# Patient Record
Sex: FEMALE | Race: WHITE | URBAN - METROPOLITAN AREA
[De-identification: names, ages, dates, MRNs, and addresses within clinical notes are randomized per-mention and may not be internally consistent; named-entity substitution may affect disease eponyms.]

---

## 2017-11-02 ENCOUNTER — APPOINTMENT (RX ONLY)
Dept: URBAN - METROPOLITAN AREA CLINIC 39 | Facility: CLINIC | Age: 32
Setting detail: DERMATOLOGY
End: 2017-11-02

## 2017-11-02 DIAGNOSIS — L57.8 OTHER SKIN CHANGES DUE TO CHRONIC EXPOSURE TO NONIONIZING RADIATION: ICD-10-CM

## 2017-11-02 DIAGNOSIS — Z87.2 PERSONAL HISTORY OF DISEASES OF THE SKIN AND SUBCUTANEOUS TISSUE: ICD-10-CM

## 2017-11-02 DIAGNOSIS — L82.1 OTHER SEBORRHEIC KERATOSIS: ICD-10-CM

## 2017-11-02 DIAGNOSIS — L81.4 OTHER MELANIN HYPERPIGMENTATION: ICD-10-CM

## 2017-11-02 DIAGNOSIS — B36.0 PITYRIASIS VERSICOLOR: ICD-10-CM

## 2017-11-02 DIAGNOSIS — L663 OTHER SPECIFIED DISEASES OF HAIR AND HAIR FOLLICLES: ICD-10-CM

## 2017-11-02 DIAGNOSIS — B35.3 TINEA PEDIS: ICD-10-CM

## 2017-11-02 DIAGNOSIS — L738 OTHER SPECIFIED DISEASES OF HAIR AND HAIR FOLLICLES: ICD-10-CM

## 2017-11-02 DIAGNOSIS — L73.9 FOLLICULAR DISORDER, UNSPECIFIED: ICD-10-CM

## 2017-11-02 DIAGNOSIS — L91.0 HYPERTROPHIC SCAR: ICD-10-CM

## 2017-11-02 DIAGNOSIS — D22 MELANOCYTIC NEVI: ICD-10-CM

## 2017-11-02 PROBLEM — L02.222 FURUNCLE OF BACK [ANY PART, EXCEPT BUTTOCK]: Status: ACTIVE | Noted: 2017-11-02

## 2017-11-02 PROBLEM — D22.72 MELANOCYTIC NEVI OF LEFT LOWER LIMB, INCLUDING HIP: Status: ACTIVE | Noted: 2017-11-02

## 2017-11-02 PROBLEM — D22.71 MELANOCYTIC NEVI OF RIGHT LOWER LIMB, INCLUDING HIP: Status: ACTIVE | Noted: 2017-11-02

## 2017-11-02 PROBLEM — D48.5 NEOPLASM OF UNCERTAIN BEHAVIOR OF SKIN: Status: ACTIVE | Noted: 2017-11-02

## 2017-11-02 PROBLEM — D22.61 MELANOCYTIC NEVI OF RIGHT UPPER LIMB, INCLUDING SHOULDER: Status: ACTIVE | Noted: 2017-11-02

## 2017-11-02 PROBLEM — D22.5 MELANOCYTIC NEVI OF TRUNK: Status: ACTIVE | Noted: 2017-11-02

## 2017-11-02 PROBLEM — D22.62 MELANOCYTIC NEVI OF LEFT UPPER LIMB, INCLUDING SHOULDER: Status: ACTIVE | Noted: 2017-11-02

## 2017-11-02 PROCEDURE — 99214 OFFICE O/P EST MOD 30 MIN: CPT

## 2017-11-02 PROCEDURE — ? PRESCRIPTION

## 2017-11-02 PROCEDURE — ? COUNSELING

## 2017-11-02 PROCEDURE — ? TREATMENT REGIMEN

## 2017-11-02 RX ORDER — CLINDAMYCIN PHOSPHATE 10 MG/ML
SOLUTION TOPICAL
Qty: 1 | Refills: 3 | Status: ERX | COMMUNITY
Start: 2017-11-02

## 2017-11-02 RX ORDER — ECONAZOLE NITRATE 10 MG/G
AEROSOL, FOAM TOPICAL
Qty: 1 | Refills: 5 | Status: ERX | COMMUNITY
Start: 2017-11-02

## 2017-11-02 RX ADMIN — CLINDAMYCIN PHOSPHATE 1: 10 SOLUTION TOPICAL at 00:00

## 2017-11-02 RX ADMIN — ECONAZOLE NITRATE: 10 AEROSOL, FOAM TOPICAL at 00:00

## 2017-11-02 ASSESSMENT — LOCATION DETAILED DESCRIPTION DERM
LOCATION DETAILED: RIGHT ANTERIOR DISTAL UPPER ARM
LOCATION DETAILED: LEFT ANTERIOR DISTAL UPPER ARM
LOCATION DETAILED: LEFT ANTERIOR PROXIMAL THIGH
LOCATION DETAILED: INFERIOR THORACIC SPINE
LOCATION DETAILED: RIGHT ANTERIOR PROXIMAL UPPER ARM
LOCATION DETAILED: LEFT VENTRAL PROXIMAL FOREARM
LOCATION DETAILED: EPIGASTRIC SKIN
LOCATION DETAILED: RIGHT ANTECUBITAL SKIN
LOCATION DETAILED: RIGHT ANTERIOR PROXIMAL THIGH
LOCATION DETAILED: PERIUMBILICAL SKIN
LOCATION DETAILED: LEFT INFERIOR UPPER BACK
LOCATION DETAILED: LEFT PLANTAR FOREFOOT OVERLYING 3RD METATARSAL
LOCATION DETAILED: RIGHT MEDIAL PLANTAR MIDFOOT

## 2017-11-02 ASSESSMENT — LOCATION SIMPLE DESCRIPTION DERM
LOCATION SIMPLE: RIGHT THIGH
LOCATION SIMPLE: LEFT THIGH
LOCATION SIMPLE: LEFT UPPER ARM
LOCATION SIMPLE: RIGHT UPPER ARM
LOCATION SIMPLE: LEFT UPPER BACK
LOCATION SIMPLE: LEFT FOREARM
LOCATION SIMPLE: LEFT PLANTAR SURFACE
LOCATION SIMPLE: RIGHT PLANTAR SURFACE
LOCATION SIMPLE: UPPER BACK
LOCATION SIMPLE: ABDOMEN

## 2017-11-02 ASSESSMENT — LOCATION ZONE DERM
LOCATION ZONE: FEET
LOCATION ZONE: LEG
LOCATION ZONE: ARM
LOCATION ZONE: TRUNK

## 2017-11-02 ASSESSMENT — SEVERITY ASSESSMENT: SEVERITY: MILD

## 2017-11-02 ASSESSMENT — SCAR ASSESSEMENT OVERALL: SCAR ASSESSMENT: 2.5 (RAISED UNIFORM SCAR, ERYTHEMA)

## 2017-11-02 ASSESSMENT — PAIN INTENSITY VAS: HOW INTENSE IS YOUR PAIN 0 BEING NO PAIN, 10 BEING THE MOST SEVERE PAIN POSSIBLE?: NO PAIN

## 2018-03-28 ENCOUNTER — HOSPITAL ENCOUNTER (OUTPATIENT)
Dept: HOSPITAL 61 - KCIC | Age: 33
Discharge: HOME | End: 2018-03-28
Attending: FAMILY MEDICINE
Payer: COMMERCIAL

## 2018-03-28 DIAGNOSIS — R07.89: Primary | ICD-10-CM

## 2018-03-28 PROCEDURE — 71046 X-RAY EXAM CHEST 2 VIEWS: CPT

## 2019-11-04 ENCOUNTER — APPOINTMENT (RX ONLY)
Dept: URBAN - METROPOLITAN AREA CLINIC 39 | Facility: CLINIC | Age: 34
Setting detail: DERMATOLOGY
End: 2019-11-04

## 2019-11-04 DIAGNOSIS — L82.1 OTHER SEBORRHEIC KERATOSIS: ICD-10-CM

## 2019-11-04 DIAGNOSIS — D18.0 HEMANGIOMA: ICD-10-CM

## 2019-11-04 DIAGNOSIS — L57.8 OTHER SKIN CHANGES DUE TO CHRONIC EXPOSURE TO NONIONIZING RADIATION: ICD-10-CM

## 2019-11-04 DIAGNOSIS — L73.9 FOLLICULAR DISORDER, UNSPECIFIED: ICD-10-CM

## 2019-11-04 DIAGNOSIS — D22 MELANOCYTIC NEVI: ICD-10-CM

## 2019-11-04 DIAGNOSIS — L81.4 OTHER MELANIN HYPERPIGMENTATION: ICD-10-CM

## 2019-11-04 DIAGNOSIS — L663 OTHER SPECIFIED DISEASES OF HAIR AND HAIR FOLLICLES: ICD-10-CM

## 2019-11-04 DIAGNOSIS — L738 OTHER SPECIFIED DISEASES OF HAIR AND HAIR FOLLICLES: ICD-10-CM

## 2019-11-04 DIAGNOSIS — L71.8 OTHER ROSACEA: ICD-10-CM

## 2019-11-04 PROBLEM — D18.01 HEMANGIOMA OF SKIN AND SUBCUTANEOUS TISSUE: Status: ACTIVE | Noted: 2019-11-04

## 2019-11-04 PROBLEM — D22.71 MELANOCYTIC NEVI OF RIGHT LOWER LIMB, INCLUDING HIP: Status: ACTIVE | Noted: 2019-11-04

## 2019-11-04 PROBLEM — D22.61 MELANOCYTIC NEVI OF RIGHT UPPER LIMB, INCLUDING SHOULDER: Status: ACTIVE | Noted: 2019-11-04

## 2019-11-04 PROBLEM — D22.5 MELANOCYTIC NEVI OF TRUNK: Status: ACTIVE | Noted: 2019-11-04

## 2019-11-04 PROBLEM — D22.72 MELANOCYTIC NEVI OF LEFT LOWER LIMB, INCLUDING HIP: Status: ACTIVE | Noted: 2019-11-04

## 2019-11-04 PROBLEM — L02.92 FURUNCLE, UNSPECIFIED: Status: ACTIVE | Noted: 2019-11-04

## 2019-11-04 PROBLEM — D22.39 MELANOCYTIC NEVI OF OTHER PARTS OF FACE: Status: ACTIVE | Noted: 2019-11-04

## 2019-11-04 PROBLEM — D22.62 MELANOCYTIC NEVI OF LEFT UPPER LIMB, INCLUDING SHOULDER: Status: ACTIVE | Noted: 2019-11-04

## 2019-11-04 PROCEDURE — ? COUNSELING

## 2019-11-04 PROCEDURE — ? TREATMENT REGIMEN

## 2019-11-04 PROCEDURE — 99214 OFFICE O/P EST MOD 30 MIN: CPT

## 2019-11-04 PROCEDURE — ? PRESCRIPTION

## 2019-11-04 RX ORDER — CLINDAMYCIN PHOSPHATE 10 MG/ML
1 LOTION TOPICAL BID
Qty: 1 | Refills: 3 | Status: ERX

## 2019-11-04 ASSESSMENT — LOCATION SIMPLE DESCRIPTION DERM
LOCATION SIMPLE: LEFT FOREARM
LOCATION SIMPLE: RIGHT UPPER ARM
LOCATION SIMPLE: ABDOMEN
LOCATION SIMPLE: CHEST
LOCATION SIMPLE: LOWER BACK
LOCATION SIMPLE: LEFT EAR
LOCATION SIMPLE: RIGHT POSTERIOR THIGH
LOCATION SIMPLE: RIGHT CHEEK
LOCATION SIMPLE: RIGHT UPPER BACK
LOCATION SIMPLE: LEFT POSTERIOR UPPER ARM
LOCATION SIMPLE: LEFT CHEEK
LOCATION SIMPLE: LEFT THIGH
LOCATION SIMPLE: RIGHT THIGH
LOCATION SIMPLE: RIGHT EAR
LOCATION SIMPLE: RIGHT FOREARM
LOCATION SIMPLE: LEFT POSTERIOR THIGH
LOCATION SIMPLE: RIGHT FOREHEAD

## 2019-11-04 ASSESSMENT — LOCATION DETAILED DESCRIPTION DERM
LOCATION DETAILED: SUBXIPHOID
LOCATION DETAILED: RIGHT SUPERIOR HELIX
LOCATION DETAILED: RIGHT ANTERIOR DISTAL UPPER ARM
LOCATION DETAILED: SUPERIOR LUMBAR SPINE
LOCATION DETAILED: LEFT ANTERIOR PROXIMAL THIGH
LOCATION DETAILED: RIGHT INFERIOR UPPER BACK
LOCATION DETAILED: RIGHT ANTERIOR DISTAL THIGH
LOCATION DETAILED: RIGHT DISTAL POSTERIOR THIGH
LOCATION DETAILED: RIGHT INFERIOR CENTRAL MALAR CHEEK
LOCATION DETAILED: LEFT SUPERIOR HELIX
LOCATION DETAILED: RIGHT FOREHEAD
LOCATION DETAILED: LEFT DISTAL POSTERIOR UPPER ARM
LOCATION DETAILED: LEFT DISTAL POSTERIOR THIGH
LOCATION DETAILED: LEFT VENTRAL PROXIMAL FOREARM
LOCATION DETAILED: RIGHT VENTRAL DISTAL FOREARM
LOCATION DETAILED: UPPER STERNUM
LOCATION DETAILED: LEFT INFERIOR CENTRAL MALAR CHEEK
LOCATION DETAILED: EPIGASTRIC SKIN
LOCATION DETAILED: RIGHT INFERIOR MEDIAL MALAR CHEEK
LOCATION DETAILED: LEFT CENTRAL MALAR CHEEK

## 2019-11-04 ASSESSMENT — LOCATION ZONE DERM
LOCATION ZONE: FACE
LOCATION ZONE: LEG
LOCATION ZONE: EAR
LOCATION ZONE: ARM
LOCATION ZONE: TRUNK

## 2019-11-04 ASSESSMENT — PAIN INTENSITY VAS: HOW INTENSE IS YOUR PAIN 0 BEING NO PAIN, 10 BEING THE MOST SEVERE PAIN POSSIBLE?: NO PAIN

## 2019-11-04 NOTE — PROCEDURE: TREATMENT REGIMEN
Detail Level: Zone
Plan: [facility] information given.
Initiate Treatment: Azelaic Acid 15%, Metronidazole 1%, Ivermectin 1% Cream   \\n[facility]

## 2020-02-03 ENCOUNTER — APPOINTMENT (RX ONLY)
Dept: URBAN - METROPOLITAN AREA CLINIC 39 | Facility: CLINIC | Age: 35
Setting detail: DERMATOLOGY
End: 2020-02-03

## 2020-02-03 DIAGNOSIS — L73.8 OTHER SPECIFIED FOLLICULAR DISORDERS: ICD-10-CM

## 2020-02-03 DIAGNOSIS — L71.8 OTHER ROSACEA: ICD-10-CM | Status: IMPROVED

## 2020-02-03 PROCEDURE — ? COUNSELING

## 2020-02-03 PROCEDURE — ? TREATMENT REGIMEN

## 2020-02-03 PROCEDURE — 99213 OFFICE O/P EST LOW 20 MIN: CPT

## 2020-02-03 ASSESSMENT — LOCATION ZONE DERM: LOCATION ZONE: FACE

## 2020-02-03 ASSESSMENT — LOCATION DETAILED DESCRIPTION DERM
LOCATION DETAILED: RIGHT INFERIOR CENTRAL MALAR CHEEK
LOCATION DETAILED: LEFT INFERIOR CENTRAL MALAR CHEEK

## 2020-02-03 ASSESSMENT — LOCATION SIMPLE DESCRIPTION DERM
LOCATION SIMPLE: RIGHT CHEEK
LOCATION SIMPLE: LEFT CHEEK

## 2020-02-03 ASSESSMENT — SEVERITY ASSESSMENT OVERALL AMONG ALL PATIENTS: IN YOUR EXPERIENCE, AMONG ALL PATIENTS YOU HAVE SEEN WITH THIS CONDITION, HOW SEVERE IS THIS PATIENT'S CONDITION?: MILD

## 2020-02-03 NOTE — PROCEDURE: TREATMENT REGIMEN
Detail Level: Zone
Continue Regimen: Azelaic Acid 15%, Metronidazole 1%, Ivermectin 1% Cream   \\n[facility]\\nSunscreen 30 SPF
Plan: Discussed continuing the cream and observing area at this time

## 2020-02-03 NOTE — PROCEDURE: COUNSELING
Detail Level: Simple
Detail Level: Zone
Patient Specific Counseling (Will Not Stick From Patient To Patient): Discussed cautery vs topical retinoid

## 2021-10-28 ENCOUNTER — APPOINTMENT (RX ONLY)
Dept: URBAN - METROPOLITAN AREA CLINIC 39 | Facility: CLINIC | Age: 36
Setting detail: DERMATOLOGY
End: 2021-10-28

## 2021-10-28 DIAGNOSIS — B36.0 PITYRIASIS VERSICOLOR: ICD-10-CM

## 2021-10-28 DIAGNOSIS — L57.8 OTHER SKIN CHANGES DUE TO CHRONIC EXPOSURE TO NONIONIZING RADIATION: ICD-10-CM

## 2021-10-28 DIAGNOSIS — B35.1 TINEA UNGUIUM: ICD-10-CM

## 2021-10-28 DIAGNOSIS — Z71.89 OTHER SPECIFIED COUNSELING: ICD-10-CM

## 2021-10-28 DIAGNOSIS — D18.0 HEMANGIOMA: ICD-10-CM

## 2021-10-28 DIAGNOSIS — D22 MELANOCYTIC NEVI: ICD-10-CM

## 2021-10-28 DIAGNOSIS — Z87.2 PERSONAL HISTORY OF DISEASES OF THE SKIN AND SUBCUTANEOUS TISSUE: ICD-10-CM

## 2021-10-28 DIAGNOSIS — L81.4 OTHER MELANIN HYPERPIGMENTATION: ICD-10-CM

## 2021-10-28 PROBLEM — D22.72 MELANOCYTIC NEVI OF LEFT LOWER LIMB, INCLUDING HIP: Status: ACTIVE | Noted: 2021-10-28

## 2021-10-28 PROBLEM — D22.61 MELANOCYTIC NEVI OF RIGHT UPPER LIMB, INCLUDING SHOULDER: Status: ACTIVE | Noted: 2021-10-28

## 2021-10-28 PROBLEM — D22.62 MELANOCYTIC NEVI OF LEFT UPPER LIMB, INCLUDING SHOULDER: Status: ACTIVE | Noted: 2021-10-28

## 2021-10-28 PROBLEM — D22.5 MELANOCYTIC NEVI OF TRUNK: Status: ACTIVE | Noted: 2021-10-28

## 2021-10-28 PROBLEM — D22.71 MELANOCYTIC NEVI OF RIGHT LOWER LIMB, INCLUDING HIP: Status: ACTIVE | Noted: 2021-10-28

## 2021-10-28 PROBLEM — D18.01 HEMANGIOMA OF SKIN AND SUBCUTANEOUS TISSUE: Status: ACTIVE | Noted: 2021-10-28

## 2021-10-28 PROBLEM — D48.5 NEOPLASM OF UNCERTAIN BEHAVIOR OF SKIN: Status: ACTIVE | Noted: 2021-10-28

## 2021-10-28 PROCEDURE — ? COUNSELING

## 2021-10-28 PROCEDURE — ? PRESCRIPTION MEDICATION MANAGEMENT

## 2021-10-28 PROCEDURE — ? PRESCRIPTION

## 2021-10-28 PROCEDURE — 99213 OFFICE O/P EST LOW 20 MIN: CPT | Mod: 25

## 2021-10-28 PROCEDURE — ? BIOPSY BY SHAVE METHOD

## 2021-10-28 PROCEDURE — 11102 TANGNTL BX SKIN SINGLE LES: CPT

## 2021-10-28 RX ORDER — FLUCONAZOLE 200 MG/1
TABLET ORAL AS DIRECTED
Qty: 2 | Refills: 1 | Status: ERX | COMMUNITY
Start: 2021-10-28

## 2021-10-28 RX ADMIN — FLUCONAZOLE: 200 TABLET ORAL at 00:00

## 2021-10-28 ASSESSMENT — LOCATION SIMPLE DESCRIPTION DERM
LOCATION SIMPLE: RIGHT 2ND TOE
LOCATION SIMPLE: RIGHT 5TH TOE
LOCATION SIMPLE: ABDOMEN
LOCATION SIMPLE: LEFT UPPER ARM
LOCATION SIMPLE: RIGHT 4TH TOE
LOCATION SIMPLE: RIGHT UPPER ARM
LOCATION SIMPLE: UPPER BACK
LOCATION SIMPLE: LEFT PRETIBIAL REGION
LOCATION SIMPLE: RIGHT GREAT TOE
LOCATION SIMPLE: RIGHT PRETIBIAL REGION
LOCATION SIMPLE: LOWER BACK
LOCATION SIMPLE: RIGHT 3RD TOE

## 2021-10-28 ASSESSMENT — LOCATION ZONE DERM
LOCATION ZONE: LEG
LOCATION ZONE: ARM
LOCATION ZONE: TRUNK
LOCATION ZONE: TOE
LOCATION ZONE: TOENAIL

## 2021-10-28 ASSESSMENT — LOCATION DETAILED DESCRIPTION DERM
LOCATION DETAILED: INFERIOR THORACIC SPINE
LOCATION DETAILED: SUPERIOR LUMBAR SPINE
LOCATION DETAILED: RIGHT 2ND TOENAIL
LOCATION DETAILED: RIGHT DISTAL PLANTAR GREAT TOE
LOCATION DETAILED: RIGHT 4TH TOENAIL
LOCATION DETAILED: RIGHT ANTERIOR DISTAL UPPER ARM
LOCATION DETAILED: RIGHT 5TH TOENAIL
LOCATION DETAILED: LEFT PROXIMAL PRETIBIAL REGION
LOCATION DETAILED: RIGHT 3RD TOENAIL
LOCATION DETAILED: RIGHT PROXIMAL PRETIBIAL REGION
LOCATION DETAILED: EPIGASTRIC SKIN
LOCATION DETAILED: LEFT ANTERIOR DISTAL UPPER ARM

## 2021-10-28 NOTE — PROCEDURE: MIPS QUALITY
Quality 130: Documentation Of Current Medications In The Medical Record: Current Medications Documented
Quality 110: Preventive Care And Screening: Influenza Immunization: Influenza Immunization Ordered or Recommended, but not Administered due to system reason
Detail Level: Detailed
Quality 226: Preventive Care And Screening: Tobacco Use: Screening And Cessation Intervention: Patient screened for tobacco use and is an ex/non-smoker

## 2021-12-15 ENCOUNTER — APPOINTMENT (RX ONLY)
Dept: URBAN - METROPOLITAN AREA CLINIC 39 | Facility: CLINIC | Age: 36
Setting detail: DERMATOLOGY
End: 2021-12-15

## 2021-12-15 DIAGNOSIS — L20.89 OTHER ATOPIC DERMATITIS: ICD-10-CM | Status: INADEQUATELY CONTROLLED

## 2021-12-15 PROBLEM — L20.84 INTRINSIC (ALLERGIC) ECZEMA: Status: ACTIVE | Noted: 2021-12-15

## 2021-12-15 PROCEDURE — ? PRESCRIPTION MEDICATION MANAGEMENT

## 2021-12-15 PROCEDURE — 99214 OFFICE O/P EST MOD 30 MIN: CPT

## 2021-12-15 PROCEDURE — ? COUNSELING

## 2021-12-15 ASSESSMENT — LOCATION DETAILED DESCRIPTION DERM: LOCATION DETAILED: LEFT INFERIOR FOREHEAD

## 2021-12-15 ASSESSMENT — LOCATION SIMPLE DESCRIPTION DERM: LOCATION SIMPLE: LEFT FOREHEAD

## 2021-12-15 ASSESSMENT — LOCATION ZONE DERM: LOCATION ZONE: FACE

## 2021-12-15 NOTE — PROCEDURE: PRESCRIPTION MEDICATION MANAGEMENT
Render In Strict Bullet Format?: No
Detail Level: Zone
Samples Given: Eucrisa ointment- Apply to AA on the face BID

## 2022-01-20 ENCOUNTER — APPOINTMENT (RX ONLY)
Dept: URBAN - METROPOLITAN AREA CLINIC 39 | Facility: CLINIC | Age: 37
Setting detail: DERMATOLOGY
End: 2022-01-20

## 2022-01-20 ENCOUNTER — APPOINTMENT (RX ONLY)
Dept: URBAN - METROPOLITAN AREA CLINIC 60 | Facility: CLINIC | Age: 37
Setting detail: DERMATOLOGY
End: 2022-01-20

## 2022-01-20 DIAGNOSIS — L57.0 ACTINIC KERATOSIS: ICD-10-CM | Status: INADEQUATELY CONTROLLED

## 2022-01-20 DIAGNOSIS — Z41.9 ENCOUNTER FOR PROCEDURE FOR PURPOSES OTHER THAN REMEDYING HEALTH STATE, UNSPECIFIED: ICD-10-CM

## 2022-01-20 DIAGNOSIS — L71.8 OTHER ROSACEA: ICD-10-CM | Status: INADEQUATELY CONTROLLED

## 2022-01-20 PROCEDURE — 99214 OFFICE O/P EST MOD 30 MIN: CPT

## 2022-01-20 PROCEDURE — ? COUNSELING

## 2022-01-20 PROCEDURE — ? PRESCRIPTION MEDICATION MANAGEMENT

## 2022-01-20 PROCEDURE — ? MICRODERMABRASION/FACIAL

## 2022-01-20 ASSESSMENT — LOCATION ZONE DERM
LOCATION ZONE: FACE
LOCATION ZONE: FACE

## 2022-01-20 ASSESSMENT — LOCATION DETAILED DESCRIPTION DERM
LOCATION DETAILED: RIGHT INFERIOR LATERAL MALAR CHEEK
LOCATION DETAILED: LEFT MENTAL CREASE
LOCATION DETAILED: LEFT SUPERIOR MEDIAL FOREHEAD
LOCATION DETAILED: LEFT INFERIOR CENTRAL MALAR CHEEK
LOCATION DETAILED: INFERIOR MID FOREHEAD

## 2022-01-20 ASSESSMENT — LOCATION SIMPLE DESCRIPTION DERM
LOCATION SIMPLE: LEFT CHEEK
LOCATION SIMPLE: INFERIOR FOREHEAD
LOCATION SIMPLE: CHIN
LOCATION SIMPLE: RIGHT CHEEK
LOCATION SIMPLE: LEFT FOREHEAD

## 2022-01-20 NOTE — PROCEDURE: MICRODERMABRASION/FACIAL
Consent: Written consent obtained, risks reviewed including but not limited to crusting, scabbing, blistering, scarring, darker or lighter pigmentary change, bruising, and/or incomplete response.
Detail Level: Zone
Serum Type (Optional): Daily Power and Defense
Vacuum Pressure: 10
Number Of Passes: 2
Endpoint: mild erythema
Spf (Optional): ZO Daily Sheer SPF 50
Facial Steaming: steamed
Extraction Method: cotton-tipped applicator
Vacuum Pressure Units: inches Hg
Prefacial Cleansing: Skin [facility] Gentle Cleanser
Treatment Number: 1
Post-Care Instructions: I reviewed with the patient in detail post-care instructions. Patient should stay away from the sun and wear sun protection until treated areas are fully healed.
Prep Text: steam and cleanse, microderm 2 passes, extractions, sc glycolic 2-3 mins, aloe mask.
Indication: skin texture
Moisturizer Type (Optional): HA5

## 2022-01-20 NOTE — PROCEDURE: PRESCRIPTION MEDICATION MANAGEMENT
Render In Strict Bullet Format?: No
Detail Level: Zone
Samples Given: Klisyri- apply to forehead bid for five days
Samples Given: Rhofade

## 2022-03-29 ENCOUNTER — APPOINTMENT (RX ONLY)
Dept: URBAN - METROPOLITAN AREA CLINIC 141 | Facility: CLINIC | Age: 37
Setting detail: DERMATOLOGY
End: 2022-03-29

## 2022-03-29 DIAGNOSIS — Z41.9 ENCOUNTER FOR PROCEDURE FOR PURPOSES OTHER THAN REMEDYING HEALTH STATE, UNSPECIFIED: ICD-10-CM

## 2022-03-29 PROCEDURE — ? MEDICAL CONSULTATION: DYNAMIC RHYTIDES

## 2022-03-30 RX ORDER — FLUCONAZOLE 200 MG/1
TABLET ORAL AS DIRECTED
Qty: 2 | Refills: 1 | Status: ERX

## 2022-05-06 ENCOUNTER — HOSPITAL ENCOUNTER (EMERGENCY)
Dept: HOSPITAL 61 - ER | Age: 37
Discharge: HOME | End: 2022-05-06
Payer: COMMERCIAL

## 2022-05-06 VITALS — BODY MASS INDEX: 26.95 KG/M2 | WEIGHT: 157.85 LBS | HEIGHT: 64 IN

## 2022-05-06 VITALS — SYSTOLIC BLOOD PRESSURE: 115 MMHG | DIASTOLIC BLOOD PRESSURE: 63 MMHG

## 2022-05-06 DIAGNOSIS — R10.13: Primary | ICD-10-CM

## 2022-05-06 DIAGNOSIS — Z88.8: ICD-10-CM

## 2022-05-06 DIAGNOSIS — R19.7: ICD-10-CM

## 2022-05-06 DIAGNOSIS — R11.0: ICD-10-CM

## 2022-05-06 DIAGNOSIS — R35.0: ICD-10-CM

## 2022-05-06 LAB
ALBUMIN SERPL-MCNC: 3.9 G/DL (ref 3.4–5)
ALBUMIN/GLOB SERPL: 1 {RATIO} (ref 1–1.7)
ALP SERPL-CCNC: 63 U/L (ref 46–116)
ALT SERPL-CCNC: < 6 U/L (ref 14–59)
ANION GAP SERPL CALC-SCNC: 11 MMOL/L (ref 6–14)
AST SERPL-CCNC: 15 U/L (ref 15–37)
BACTERIA #/AREA URNS HPF: 0 /HPF
BASOPHILS # BLD AUTO: 0 X10^3/UL (ref 0–0.2)
BASOPHILS NFR BLD: 0 % (ref 0–3)
BILIRUB SERPL-MCNC: 0.4 MG/DL (ref 0.2–1)
BUN SERPL-MCNC: 7 MG/DL (ref 7–20)
BUN/CREAT SERPL: 9 (ref 6–20)
CALCIUM SERPL-MCNC: 9.1 MG/DL (ref 8.5–10.1)
CHLORIDE SERPL-SCNC: 104 MMOL/L (ref 98–107)
CO2 SERPL-SCNC: 27 MMOL/L (ref 21–32)
CREAT SERPL-MCNC: 0.8 MG/DL (ref 0.6–1)
EOSINOPHIL NFR BLD: 0 X10^3/UL (ref 0–0.7)
EOSINOPHIL NFR BLD: 1 % (ref 0–3)
ERYTHROCYTE [DISTWIDTH] IN BLOOD BY AUTOMATED COUNT: 12.4 % (ref 11.5–14.5)
GFR SERPLBLD BASED ON 1.73 SQ M-ARVRAT: 81.6 ML/MIN
GLUCOSE SERPL-MCNC: 88 MG/DL (ref 70–99)
HCT VFR BLD CALC: 39.6 % (ref 36–47)
HGB BLD-MCNC: 13.9 G/DL (ref 12–15.5)
LIPASE: 64 U/L (ref 73–393)
LYMPHOCYTES # BLD: 1.1 X10^3/UL (ref 1–4.8)
LYMPHOCYTES NFR BLD AUTO: 18 % (ref 24–48)
MCH RBC QN AUTO: 32 PG (ref 25–35)
MCHC RBC AUTO-ENTMCNC: 35 G/DL (ref 31–37)
MCV RBC AUTO: 90 FL (ref 79–100)
MONO #: 0.4 X10^3/UL (ref 0–1.1)
MONOCYTES NFR BLD: 7 % (ref 0–9)
NEUT #: 4.6 X10^3/UL (ref 1.8–7.7)
NEUTROPHILS NFR BLD AUTO: 75 % (ref 31–73)
PLATELET # BLD AUTO: 249 X10^3/UL (ref 140–400)
POTASSIUM SERPL-SCNC: 3.7 MMOL/L (ref 3.5–5.1)
PROT SERPL-MCNC: 7.8 G/DL (ref 6.4–8.2)
RBC # BLD AUTO: 4.39 X10^6/UL (ref 3.5–5.4)
RBC #/AREA URNS HPF: 0 /HPF (ref 0–2)
SODIUM SERPL-SCNC: 142 MMOL/L (ref 136–145)
WBC # BLD AUTO: 6.1 X10^3/UL (ref 4–11)
WBC #/AREA URNS HPF: (no result) /HPF (ref 0–4)

## 2022-05-06 PROCEDURE — 96374 THER/PROPH/DIAG INJ IV PUSH: CPT

## 2022-05-06 PROCEDURE — 96361 HYDRATE IV INFUSION ADD-ON: CPT

## 2022-05-06 PROCEDURE — 83690 ASSAY OF LIPASE: CPT

## 2022-05-06 PROCEDURE — 76705 ECHO EXAM OF ABDOMEN: CPT

## 2022-05-06 PROCEDURE — 85025 COMPLETE CBC W/AUTO DIFF WBC: CPT

## 2022-05-06 PROCEDURE — 81025 URINE PREGNANCY TEST: CPT

## 2022-05-06 PROCEDURE — 87086 URINE CULTURE/COLONY COUNT: CPT

## 2022-05-06 PROCEDURE — 74177 CT ABD & PELVIS W/CONTRAST: CPT

## 2022-05-06 PROCEDURE — 81001 URINALYSIS AUTO W/SCOPE: CPT

## 2022-05-06 PROCEDURE — 99285 EMERGENCY DEPT VISIT HI MDM: CPT

## 2022-05-06 PROCEDURE — 80053 COMPREHEN METABOLIC PANEL: CPT

## 2022-05-06 PROCEDURE — 36415 COLL VENOUS BLD VENIPUNCTURE: CPT

## 2022-05-06 NOTE — RAD
EXAM: ULTRASOUND ABDOMEN LIMITED 5/6/2022



CLINICAL HISTORY: Reason: right upper abdominal pain / Spl. Instructions:  / History:  .



COMPARISON: None available.



TECHNIQUE: Limited ultrasound examination of the right upper quadrant of the abdomen was performed.



FINDINGS:

Liver is homogeneous. No focal hepatic mass. Intrahepatic and extrahepatic biliary tree normal in karly
iber. The CBD measures 4 mm.



Gallbladder normal in size and echogenicity. No gallbladder wall thickening or pericholecystic fluid.
 No gallstones are seen.



Right kidney measures 10.4 cm in length without hydronephrosis. Left kidney was not imaged.



Limited visualized portions of pancreas aorta and IVC unremarkable. No significant ascites.



IMPRESSION: 

No acute sonographic abnormality.



Electronically signed by: Luke Mckenzie MD (5/6/2022 3:31 PM) [facility]

## 2022-05-06 NOTE — RAD
CT abdomen pelvis with contrast dated 5/6/2022.



COMPARISON: None



INDICATION: Pain.



TECHNIQUE:



Contiguous axial imaging the abdomen pelvis performed following the intravenous and demonstration of 
75 cc Isovue-370. 

One or more of the following individualized dose reduction techniques were utilized for this examinat
ion:  

1. Automated exposure control  

2. Adjustment of the mA and/or kV according to patient size  

3. Use of iterative reconstruction technique.



FINDINGS:



Limited images of lung bases are clear. Heart size is within normal limits. No pleural or pericardial
 effusion.



Liver, spleen, pancreas, adrenal glands, gallbladder and kidneys are unremarkable. No hydronephrosis.
 Tiny hypodensity in the posterior right lobe liver, likely small cyst.



Unopacified GI tract normal in caliber and contour. No bowel wall thickening. No inflammatory strandi
ng in the mesentery. Appendix normal in caliber. No ascites or lymphadenopathy. Abdominal aorta cedrick
l in caliber.



Images of pelvis show mildly distended urinary bladder. Uterus and adnexa are unremarkable. No free f
luid. No pelvic adenopathy.



Bone window show no acute finding.



IMPRESSION:



No acute abnormality of abdomen or pelvis.



Electronically signed by: Luke Mckenzie MD (5/6/2022 2:35 PM) [facility]

## 2022-05-06 NOTE — PHYS DOC
Past Medical History


Past Medical History:  No Pertinent History


Past Surgical History:  Tonsillectomy


Alcohol Use:  None


Drug Use:  None





General Adult


EDM:


Chief Complaint:  FLANK PAIN





HPI:


HPI:





Patient is a 37-year-old female that presents today with abdominal pain.  

Patient states that 3 days ago she was out with a relative and they had a meal 

that consisted of a "greasy burrito" and she said since that time she has had 

diarrhea stools, increased abdominal pain of the left upper abdomen and 

heartburn.  Patient states that she had this a couple of years ago and she had 

multiple radiological test done and they did not show any medical reason why she

was having those symptoms, she presents today because they have come back and 

she is wanting further evaluation and management of this.  Patient states her 

last normal menstrual period was April 24, 2022, she did state that 3 weeks ago 

she had a tonsillectomy and she said that she took a Zofran this morning because

she was afraid that she might throw up and she is concerned that the scabs from 

her tonsillectomy may loosen if she were to throw up.  Patient denies chest 

pain, shortness of breath, fever chills, she does state that she has had 

frequency in urination and she has noticed "tissue" in her urine.





Review of Systems:


Review of Systems:


Constitutional:   Denies fever or chills. []


Eyes:   Denies change in visual acuity. []


HENT:   Denies nasal congestion or sore throat. [] 


Respiratory:   Denies cough or shortness of breath. [] 


Cardiovascular:   Denies chest pain or edema. [] 


GI:   abdominal pain, nausea, diarrhea. [] 


: Frequent urination denies dysuria. [] 


Musculoskeletal:   Denies back pain or joint pain. [] 


Integument:   Denies rash. [] 


Neurologic:   Denies headache, focal weakness or sensory changes. [] 


Endocrine:   Denies polyuria or polydipsia. [] 


Lymphatic:  Denies swollen glands. [] 


Psychiatric:  Denies depression or anxiety. []





Heart Score:


C/O Chest Pain:  No


Risk Factors:


Risk Factors:  DM, Current or recent (<one month) smoker, HTN, HLP, family 

history of CAD, obesity.


Risk Scores:


Score 0 - 3:  2.5% MACE over next 6 weeks - Discharge Home


Score 4 - 6:  20.3% MACE over next 6 weeks - Admit for Clinical Observation


Score 7 - 10:  72.7% MACE over next 6 weeks - Early Invasive Strategies





Current Medications:





Current Medications








 Medications


  (Trade)  Dose


 Ordered  Sig/Liz  Start Time


 Stop Time Status Last Admin


Dose Admin


 


 Famotidine


  (Pepcid Vial)  20 mg  1X  ONCE  5/6/22 13:30


 5/6/22 13:31 UNV  





 


 Sodium Chloride  1,000 ml @ 


 999 mls/hr  1X  ONCE  5/6/22 13:30


 5/6/22 14:30 UNV  














Allergies:


Allergies:





Allergies








Coded Allergies Type Severity Reaction Last Updated Verified


 


  metronidazole Allergy Unknown UNKNOWN 5/6/22 Yes











Physical Exam:


PE:





Constitutional: Well developed, well nourished, no acute distress, non-toxic 

appearance. []


HENT: Normocephalic, atraumatic, bilateral external ears normal, oropharynx 

moist, no oral exudates, nose normal. []


Eyes: PERRLA, EOMI, conjunctiva normal, no discharge. [] 


Neck: Normal range of motion, no tenderness, supple, no stridor. [] 


Cardiovascular:Heart rate regular rhythm, no murmur []


Lungs & Thorax:  Bilateral breath sounds clear to auscultation []


Abdomen: Bowel sounds normal, soft, no tenderness, no masses, no pulsatile 

masses. [] 


Skin: Warm, dry, no erythema, no rash. [] 


Back: No tenderness, no CVA tenderness. [] 


Extremities: No tenderness, no cyanosis, no clubbing, ROM intact, no edema. [] 


Neurologic: Alert and oriented X 3, normal motor function, normal sensory 

function, no focal deficits noted. []


Psychologic: Affect normal, judgement normal, mood normal. []





Current Patient Data:


Labs:





Laboratory Tests








Test


 5/6/22


13:04 5/6/22


13:13 5/6/22


13:35


 


Urine Collection Type Unknown   


 


Urine Color (Auto) Colorless   


 


Urine Turbidity Hazy   


 


Urine pH (Auto) 5.5   


 


Urine Specific Gravity 1.002   


 


Urine Protein (Auto) Negative mg/dL   


 


Urine Glucose (Auto)(UA) Negative mg/dL   


 


Urine Ketones (Auto) 20 mg/dL   


 


Urine Blood (Auto) Negative   


 


Urine Nitrite Negative   


 


Urine Bilirubin (Auto) Negative   


 


Urine Urobilinogen (Auto) Normal mg/dL   


 


Urine Leukocyte Esterase


(Auto) Small 


 


 





 


Urine RBC 0 /HPF   


 


Urine WBC 1-4 /HPF   


 


Urine Squamous Epithelial


Cells Many /LPF 


 


 





 


Urine Bacteria 0 /HPF   


 


Bedside Urine HCG, Qualitative  Hcg negative  


 


White Blood Count   6.1 x10^3/uL 


 


Red Blood Count   4.39 x10^6/uL 


 


Hemoglobin   13.9 g/dL 


 


Hematocrit   39.6 % 


 


Mean Corpuscular Volume   90 fL 


 


Mean Corpuscular Hemoglobin   32 pg 


 


Mean Corpuscular Hemoglobin


Concent 


 


 35 g/dL 





 


Red Cell Distribution Width   12.4 % 


 


Platelet Count   249 x10^3/uL 


 


Neutrophils (%) (Auto)   75 % 


 


Lymphocytes (%) (Auto)   18 % 


 


Monocytes (%) (Auto)   7 % 


 


Eosinophils (%) (Auto)   1 % 


 


Basophils (%) (Auto)   0 % 


 


Neutrophils # (Auto)   4.6 x10^3/uL 


 


Lymphocytes # (Auto)   1.1 x10^3/uL 


 


Monocytes # (Auto)   0.4 x10^3/uL 


 


Eosinophils # (Auto)   0.0 x10^3/uL 


 


Basophils # (Auto)   0.0 x10^3/uL 


 


Sodium Level   142 mmol/L 


 


Potassium Level   3.7 mmol/L 


 


Chloride Level   104 mmol/L 


 


Carbon Dioxide Level   27 mmol/L 


 


Anion Gap   11 


 


Blood Urea Nitrogen   7 mg/dL 


 


Creatinine   0.8 mg/dL 


 


Estimated GFR


(Cockcroft-Gault) 


 


 81.6 





 


BUN/Creatinine Ratio   9 


 


Glucose Level   88 mg/dL 


 


Calcium Level   9.1 mg/dL 


 


Total Bilirubin   0.4 mg/dL 


 


Aspartate Amino Transf


(AST/SGOT) 


 


 15 U/L 





 


Alanine Aminotransferase


(ALT/SGPT) 


 


 < 6 U/L 





 


Alkaline Phosphatase   63 U/L 


 


Total Protein   7.8 g/dL 


 


Albumin   3.9 g/dL 


 


Albumin/Globulin Ratio   1.0 


 


Lipase   64 U/L 








Current Medications








 Medications


  (Trade)  Dose


 Ordered  Sig/Liz


 Route


 PRN Reason  Start Time


 Stop Time Status Last Admin


Dose Admin


 


 Sodium Chloride  1,000 ml @ 


 999 mls/hr  1X  ONCE


 IV


   5/6/22 13:45


 5/6/22 14:45  5/6/22 13:50





 


 Famotidine


  (Pepcid Vial)  20 mg  1X  ONCE


 IVP


   5/6/22 13:45


 5/6/22 13:46 DC 5/6/22 13:50





 


 Iohexol


  (Omnipaque 300


 Mg/ml)  75 ml  1X  ONCE


 IV


   5/6/22 14:00


 5/6/22 14:01 DC 5/6/22 14:10





 


 Info


  (CONTRAST GIVEN


 -- Rx MONITORING)  1 each  PRN DAILY  PRN


 [facility]


 SEE COMMENTS  5/6/22 14:00


 5/8/22 13:59   











                                Laboratory Tests








Test


 5/6/22


13:04 5/6/22


13:13


 


Urine Collection Type Unknown   


 


Urine Color (Auto) Colorless   


 


Urine Turbidity Hazy   


 


Urine pH (Auto)


 5.5 (<5.0-8.0)


 





 


Urine Specific Gravity


 1.002


(1.000-1.030) 





 


Urine Protein (Auto)


 Negative mg/dL


(Negative) 





 


Urine Glucose (Auto)(UA)


 Negative mg/dL


(Negative) 





 


Urine Ketones (Auto)


 20 mg/dL


(Negative) 





 


Urine Blood (Auto)


 Negative


(Negative) 





 


Urine Nitrite


 Negative


(Negative) 





 


Urine Bilirubin (Auto)


 Negative


(Negative) 





 


Urine Urobilinogen (Auto)


 Normal mg/dL


(Normal) 





 


Urine Leukocyte Esterase


(Auto) Small


(Negative) 





 


Urine RBC 0 /HPF (0-2)   


 


Urine WBC


 1-4 /HPF (0-4)


 





 


Urine Squamous Epithelial


Cells Many /LPF  


 





 


Urine Bacteria


 0 /HPF (0-FEW)


 





 


POC Urine HCG, Qualitative


 


 Hcg negative


(Negative)








Vital Signs:





Vital Signs








  Date Time  Temp Pulse Resp B/P (MAP) Pulse Ox O2 Delivery O2 Flow Rate FiO2


 


5/6/22 15:42  84  115/63 (80) 99 Room Air  


 


5/6/22 15:17  81  126/64 (84) 98 Room Air  


 


5/6/22 14:42  82  112/70 (84) 98 Room Air  


 


5/6/22 14:12  87  125/68 (87) 98 Room Air  


 


5/6/22 13:34  88  116/72 (87) 97 Room Air  


 


5/6/22 12:53 99.1 110 16 148/67 (94) 98 Room Air  





 99.1       








                                   Vital Signs








  Date Time  Temp Pulse Resp B/P (MAP) Pulse Ox O2 Delivery O2 Flow Rate FiO2


 


5/6/22 12:53 99.1 110 16 148/67 (94) 98 Room Air  





 99.1       











EKG:


EKG:


[]





Radiology/Procedures:


Radiology/Procedures:


REASON: diarrhea, upper abdominal pain


PROCEDURE: CT ABD PELV W/ IV CONTRST ONLY





CT abdomen pelvis with contrast dated 5/6/2022.





COMPARISON: None





INDICATION: Pain.





TECHNIQUE:





Contiguous axial imaging the abdomen pelvis performed following the intravenous 

and demonstration of 75 cc Isovue-370. 


One or more of the following individualized dose reduction techniques were 

utilized for this examination:  


1. Automated exposure control  


2. Adjustment of the mA and/or kV according to patient size  


3. Use of iterative reconstruction technique.





FINDINGS:





Limited images of lung bases are clear. Heart size is within normal limits. No 

pleural or pericardial effusion.





Liver, spleen, pancreas, adrenal glands, gallbladder and kidneys are 

unremarkable. No hydronephrosis. Tiny hypodensity in the posterior right lobe 

liver, likely small cyst.





Unopacified GI tract normal in caliber and contour. No bowel wall thickening. No

 inflammatory stranding in the mesentery. Appendix normal in caliber. No ascites

 or lymphadenopathy. Abdominal aorta normal in caliber.





Images of pelvis show mildly distended urinary bladder. Uterus and adnexa are 

unremarkable. No free fluid. No pelvic adenopathy.





Bone window show no acute finding.





IMPRESSION:





No acute abnormality of abdomen or pelvis.





Electronically signed by: Luke Mckenzie MD (5/6/2022 2:35 PM) [facility][]





REASON: right upper abdominal pain


PROCEDURE: ABDOMEN LTD





EXAM: ULTRASOUND ABDOMEN LIMITED 5/6/2022





CLINICAL HISTORY: Reason: right upper abdominal pain / Spl. Instructions:  / 

History:  .





COMPARISON: None available.





TECHNIQUE: Limited ultrasound examination of the right upper quadrant of the 

abdomen was performed.





FINDINGS:


Liver is homogeneous. No focal hepatic mass. Intrahepatic and extrahepatic 

biliary tree normal in caliber. The CBD measures 4 mm.





Gallbladder normal in size and echogenicity. No gallbladder wall thickening or 

pericholecystic fluid. No gallstones are seen.





Right kidney measures 10.4 cm in length without hydronephrosis. Left kidney was 

not imaged.





Limited visualized portions of pancreas aorta and IVC unremarkable. No 

significant ascites.





IMPRESSION: 


No acute sonographic abnormality.





Electronically signed by: Luke Mckenzie MD (5/6/2022 3:31 PM) [facility]





Course & Med Decision Making:


Course & Med Decision Making


Pertinent Labs and Imaging studies reviewed. (See chart for details)





9855 reassessment of patient patient still continues to have right upper 

quadrant abdominal pain I did inform her that her CT scan and her labs show no 

acute findings, I will order a gallbladder ultrasound to rule out any 

gallbladder issues at this time, I did inform her that she will need to follow-

up with her primary care physician should the ultrasound be negative for any 

acute findings this may need further investigation by a gastrointestinal 

specialist  on an outpatient basis.





3790 I reviewed ultrasound results with patient did inform her there is no acute

 findings, since she continues to have issues I advised her to take Pepcid 20 mg

 twice daily until she is followed up by her primary care physician at which 

time a GI specialist should be considered for further evaluation and management 

of her symptoms.  Patient is encouraged to return here to the emergency 

department should she have pain that localizes to the right lower quadrant, she 

has fever or she is unable to tolerate any by mouth fluids.  Patient verbalized 

understanding this agreeable with plan of care.





Dragon Disclaimer:


Dragon Disclaimer:


This electronic medical record was generated, in whole or in part, using a voice

 recognition dictation system.





Departure


Departure


Impression:  


   Primary Impression:  


   Abdominal pain


   Qualified Codes:  R10.13 - Epigastric pain


Disposition:  01 HOME / SELF CARE / HOMELESS


Condition:  STABLE


Referrals:  


NO PCP (PCP)


Patient Instructions:  Abdominal Pain





Additional Instructions:  


Pepcid 20 mg take 1 tablet twice daily until you are seen by your primary care 

physician


Follow-up with your primary care physician or one of the listed clinics below 

for further evaluation and management of your abdominal pain which may include a

 referral to a GI specialist


Return to the emergency department should you have abdominal pain that localizes

 to the right lower quadrant, development of a fever, or inability to take any 

by mouth fluids due to nausea and vomiting.





[facility]


[address] [place]  [address]


[phone]





[facility]


[address] [place]  [address]


[phone]





[facility]


[address]


[place]  [address]


[phone]





[facility]


[address]


[place]  [address]


[phone]





[facility]


[address] [place]  [address]


[phone]





[facility]


[address] [place]  [address]


[phone]





[facility]


[address]


[place]  [address]


[phone]





[facility]


[address]


[place]  [address]


[phone]





[facility] 


[address]


[place]  [address]


[phone]





[facility]


[address] [place]  [address]


[phone]


[facility]


Famotidine (PEPCID) 20 Mg Tablet


20 MG PO BID, #60 TAB


   Prov: STEPHANI BLACKMON APREVE         5/6/22











STEPHANI BLACKMON APREVE        May 6, 2022 13:37